# Patient Record
(demographics unavailable — no encounter records)

---

## 2025-05-09 NOTE — RISK ASSESSMENT
[Grade: ____] : Grade: [unfilled] [Has had sexual intercourse] : has had sexual intercourse [Vaginal] : vaginal [With Teen] : teen [de-identified] : lives with mother and brother - feels safe at home [de-identified] : attends [employer]  [de-identified] : last sexual activity: 3/9/25, always uses condoms, not on birth control, # of sexual partners: male, previously tested for STIs in January 2025

## 2025-05-09 NOTE — REVIEW OF SYSTEMS
[Rash] : rash [Itching] : itching [Hives] : hives [Negative] : Constitutional [Shortness of Breath] : no shortness of breath [Vomiting] : no vomiting [Diarrhea] : no diarrhea [Abdominal Pain] : no abdominal pain

## 2025-05-09 NOTE — HISTORY OF PRESENT ILLNESS
[de-identified] : rash [FreeTextEntry6] : 15-year-old female presenting with itchy rash on back and legs. Pt reports rash began 2 days ago. Pt reports that the rash is spreading.   Pt took allergy medication for the past two day with some relief - does not know the name. Pt has not taken any medication today.  Pt denies any food, medication, or environmental allergies. Pt has not experienced this rash in the past.   Pt used a new perfume 6 days ago one time - sprayed on chest area and in hair. Pt does not recall any other new products, detergents, washes, clothing, bed, bedding etc.   Pt denies shortness of breath or difficulty breathing.

## 2025-05-09 NOTE — PHYSICAL EXAM
[NL] : regular rate and rhythm, normal S1, S2 audible, no murmurs [de-identified] : Right side of back: + hives, erythema, dermatographism; Lower right & left leg: + hives, erythema

## 2025-05-09 NOTE — DISCUSSION/SUMMARY
[FreeTextEntry1] : 15-year-old female presenting for urticaria and sexual health services.   1) Urticaria  -HPI & exam consistent with urticaria.  -Unable to identify an offending agent. No reported increase in stressors.  -Dispensed Cetirizine 10 mg 1 tab po x 1 now and 1 in 24 hours. Recommended non-drowsy antihistamine for the next week.  -Dispensed Hydrocortisone cream 1% - use on affected areas twice a day for one week. -Abstain from itching.  -Return to health center on 5/12/25 if symptoms persist.   2) Sexual Health Services  -Ordered urine GC/CT.  -Dispensed 1 [employer] Advance. Counseled regarding indications for use. Consent reviewed and signed.  -Encouraged consistent condom use. Condoms offered.  -Pt declined further discussion of contraceptive methods.  -Return to health center as needed.

## 2025-05-13 NOTE — HISTORY OF PRESENT ILLNESS
[de-identified] : urticaria  [FreeTextEntry6] : 15-year-old female presenting for follow-up of new onset urticaria.   Pt has been taking Cetirizine 10 mg daily as directed. Pt uses hydrocortisone cream as needed especially with itching immediately after showering. Pt reports significantly less hives and less itching since using Cetirizine. However, pt continues to have hives daily. Today pt has hives on her lower legs.   Pt reports that she was sick with a fever about 1 week prior to the onset of hives.   No history of food or environmental allergies. Other than illness no other preceding symptoms.   Last sexual activity: March 2025

## 2025-05-13 NOTE — DISCUSSION/SUMMARY
[FreeTextEntry1] : 15-year-old female presenting for follow-up of new onset acute urticaria possibly due to recent viral illness.  -Urticaria significantly improved but not resolved.  -Continue with Cetirizine 10 mg. Advised pt to stop Cetirizine 10 mg after 2 consecutive days of no hives.  -Return to health center if urticaria persists or worsens and/or if urticaria returns after stopping antihistamine.

## 2025-05-13 NOTE — PHYSICAL EXAM
[NL] : no acute distress, alert [de-identified] : lower extremities: + few scattered hives, + erythema from itching